# Patient Record
Sex: MALE | Race: ASIAN
[De-identification: names, ages, dates, MRNs, and addresses within clinical notes are randomized per-mention and may not be internally consistent; named-entity substitution may affect disease eponyms.]

---

## 2021-09-01 ENCOUNTER — DASHBOARD ENCOUNTERS (OUTPATIENT)
Age: 31
End: 2021-09-01

## 2021-09-01 ENCOUNTER — WEB ENCOUNTER (OUTPATIENT)
Dept: URBAN - METROPOLITAN AREA CLINIC 115 | Facility: CLINIC | Age: 31
End: 2021-09-01

## 2021-09-01 ENCOUNTER — OFFICE VISIT (OUTPATIENT)
Dept: URBAN - METROPOLITAN AREA CLINIC 115 | Facility: CLINIC | Age: 31
End: 2021-09-01
Payer: COMMERCIAL

## 2021-09-01 DIAGNOSIS — R14.0 ABDOMINAL BLOATING: ICD-10-CM

## 2021-09-01 DIAGNOSIS — R19.4 CHANGE IN BOWEL HABITS: ICD-10-CM

## 2021-09-01 DIAGNOSIS — R10.13 EPIGASTRIC ABDOMINAL PAIN: ICD-10-CM

## 2021-09-01 PROBLEM — 129851009: Status: ACTIVE | Noted: 2021-09-01

## 2021-09-01 PROBLEM — 79922009: Status: ACTIVE | Noted: 2021-09-01

## 2021-09-01 PROCEDURE — 99203 OFFICE O/P NEW LOW 30 MIN: CPT | Performed by: INTERNAL MEDICINE

## 2021-09-01 RX ORDER — PANTOPRAZOLE SODIUM 40 MG/1
1 TABLET TABLET, DELAYED RELEASE ORAL ONCE A DAY
Qty: 30 TABLET | Refills: 1 | OUTPATIENT
Start: 2021-09-01

## 2021-09-01 RX ORDER — DICYCLOMINE HYDROCHLORIDE 20 MG/1
TAKE 1/2 TO 1 TAB BY MOUTH 3 TIMES A DAY AS NEEDED FOR CRAMPING ABD PAIN TABLET ORAL
Qty: 60 TABLET | Refills: 1 | OUTPATIENT
Start: 2021-09-01 | End: 2021-10-31

## 2021-09-01 NOTE — HPI-TODAY'S VISIT:
Mr. Ran Cordoba is a 31-year-old male came into the office with the complaints of having sudden onset of change in bowel habits as well as significant abdominal bloating and distention.  Patient stated he started having the symptoms Wednesday after he ate 3 meals outside.  He had [employer] food on Wednesday and Tuesday he ate outside.  He admits to having significant abdominal bloating distention tightness he was not able to pass the stools and hence he had taken some laxatives with a result of loose watery loose watery stools and Saturday.  He denies any rectal bleeding.  He was concerned about not having regular bowel movements and wants to get treated for the constipation.  Denies any unintentional unintentional weight loss.  Patient denies any black tarry stools.  Patient denies any chronic acid reflux however he was having left upper quadrant epigastric as well as periumbilical bloating and discomfort which is slightly better since last night.  Since his symptoms are slightly better he started eating regular food.  Denies any melanotic stools.  Denies taking NSAIDs.  Denies daily constipation his usual routine for bowel movements are 1 to 2/day.  No family history of inflammatory bowel disease.

## 2021-09-03 LAB
H PYLORI BREATH TEST: NEGATIVE
H. PYLORI BREATH COLLECTION: (no result)